# Patient Record
Sex: MALE | Race: WHITE | URBAN - METROPOLITAN AREA
[De-identification: names, ages, dates, MRNs, and addresses within clinical notes are randomized per-mention and may not be internally consistent; named-entity substitution may affect disease eponyms.]

---

## 2024-03-20 ENCOUNTER — LAB (OUTPATIENT)
Dept: URBAN - METROPOLITAN AREA CLINIC 113 | Facility: CLINIC | Age: 44
End: 2024-03-20

## 2024-03-20 ENCOUNTER — OV NP (OUTPATIENT)
Dept: URBAN - METROPOLITAN AREA CLINIC 113 | Facility: CLINIC | Age: 44
End: 2024-03-20
Payer: SELF-PAY

## 2024-03-20 VITALS
SYSTOLIC BLOOD PRESSURE: 125 MMHG | RESPIRATION RATE: 16 BRPM | TEMPERATURE: 99.5 F | HEIGHT: 74 IN | HEART RATE: 116 BPM | WEIGHT: 223 LBS | BODY MASS INDEX: 28.62 KG/M2 | DIASTOLIC BLOOD PRESSURE: 80 MMHG

## 2024-03-20 DIAGNOSIS — K70.31 ALCOHOLIC CIRRHOSIS OF LIVER WITH ASCITES: ICD-10-CM

## 2024-03-20 DIAGNOSIS — R60.0 LOWER EXTREMITY EDEMA: ICD-10-CM

## 2024-03-20 DIAGNOSIS — R14.0 BLOATING: ICD-10-CM

## 2024-03-20 PROBLEM — 420054005: Status: ACTIVE | Noted: 2024-03-20

## 2024-03-20 PROCEDURE — 99204 OFFICE O/P NEW MOD 45 MIN: CPT | Performed by: INTERNAL MEDICINE

## 2024-03-20 RX ORDER — AMLODIPINE BESYLATE 10 MG/1
1 TABLET TABLET ORAL ONCE A DAY
Status: ACTIVE | COMMUNITY

## 2024-03-20 RX ORDER — FUROSEMIDE 20 MG/1
1 TABLET TABLET ORAL ONCE A DAY
Qty: 90 TABLET | Refills: 3 | OUTPATIENT
Start: 2024-03-20

## 2024-03-20 RX ORDER — SPIRONOLACTONE 50 MG/1
1 TABLET TABLET, FILM COATED ORAL ONCE A DAY
Qty: 90 TABLET | Refills: 3 | OUTPATIENT
Start: 2024-03-20 | End: 2025-03-15

## 2024-03-20 NOTE — PHYSICAL EXAM GASTROINTESTINAL
Abdomen is soft, nontender, moderately distended and tympanic , no rebound or guarding. No organomegaly appreciated

## 2024-03-20 NOTE — HPI-TODAY'S VISIT:
Patient presents today for initial evaluation.  Recently seen in the emergency department with few months of abdominal bloating.  Had imaging and labs consistent with cirrhosis of the liver.  He does report drinking on a daily basis.  Tells me he was not drinking to get drunk but would drink multiple beers and sometimes a few shots a day.  This has been longstanding.  Denies any obvious jaundice or dark urine.  No recent blood in the stool.  He does report lower extremity swelling for some time.  He denies any a lot of salt in his diet.  He tells me he stopped drinking now completely for the last 2 weeks.  He is accompanied by his parents today who confirmed.  No history of IV drug use or intranasal cocaine use.  No family history of liver disease. I reviewed labs and imaging.  He had normal CBC.  Bilirubin 3.9, AST 65, ALT 23, alk phos 230.  Normal amylase and lipase.  His ultrasound did show cirrhosis with some mild ascites.  no confusion.

## 2024-03-21 LAB
A/G RATIO: 0.7
ABSOLUTE BASOPHILS: 61
ABSOLUTE EOSINOPHILS: 112
ABSOLUTE LYMPHOCYTES: 1571
ABSOLUTE MONOCYTES: 602
ABSOLUTE NEUTROPHILS: 7854
ALBUMIN: 3.5
ALKALINE PHOSPHATASE: 195
ALT (SGPT): 15
AST (SGOT): 42
BASOPHILS: 0.6
BILIRUBIN, TOTAL: 3.2
BUN/CREATININE RATIO: (no result)
BUN: 9
CALCIUM: 8.9
CARBON DIOXIDE, TOTAL: 21
CHLORIDE: 96
CREATININE: 0.67
EGFR: 117
EOSINOPHILS: 1.1
GLOBULIN, TOTAL: 5.3
GLUCOSE: 78
HEMATOCRIT: 43.5
HEMOGLOBIN: 15.1
INR: 1.2
LYMPHOCYTES: 15.4
MCH: 36
MCHC: 34.7
MCV: 103.8
MONOCYTES: 5.9
MPV: 9.8
NEUTROPHILS: 77
PLATELET COUNT: 215
POTASSIUM: 3.7
PROTEIN, TOTAL: 8.8
PT: 13
RDW: 11.9
RED BLOOD CELL COUNT: 4.19
SODIUM: 133
WHITE BLOOD CELL COUNT: 10.2

## 2024-04-01 ENCOUNTER — EGD (OUTPATIENT)
Dept: URBAN - METROPOLITAN AREA MEDICAL CENTER 2 | Facility: MEDICAL CENTER | Age: 44
End: 2024-04-01
Payer: SELF-PAY

## 2024-04-01 DIAGNOSIS — K76.6 CLINICALLY SIGNIFICANT PORTAL HYPERTENSION: ICD-10-CM

## 2024-04-01 DIAGNOSIS — I85.10 ESOPH VARICE OTHER DIS: ICD-10-CM

## 2024-04-01 DIAGNOSIS — K31.89 ACHYLIA: ICD-10-CM

## 2024-04-01 DIAGNOSIS — K74.69 CIRRHOSIS, CRYPTOGENIC: ICD-10-CM

## 2024-04-01 PROCEDURE — 43244 EGD VARICES LIGATION: CPT | Performed by: INTERNAL MEDICINE

## 2024-04-01 RX ORDER — AMLODIPINE BESYLATE 10 MG/1
1 TABLET TABLET ORAL ONCE A DAY
Status: ACTIVE | COMMUNITY

## 2024-04-01 RX ORDER — SPIRONOLACTONE 50 MG/1
1 TABLET TABLET, FILM COATED ORAL ONCE A DAY
Qty: 90 TABLET | Refills: 3 | Status: ACTIVE | COMMUNITY
Start: 2024-03-20 | End: 2025-03-15

## 2024-04-01 RX ORDER — FUROSEMIDE 20 MG/1
1 TABLET TABLET ORAL ONCE A DAY
Qty: 90 TABLET | Refills: 3 | Status: ACTIVE | COMMUNITY
Start: 2024-03-20

## 2024-05-16 ENCOUNTER — OFFICE VISIT (OUTPATIENT)
Dept: URBAN - METROPOLITAN AREA CLINIC 113 | Facility: CLINIC | Age: 44
End: 2024-05-16
Payer: SELF-PAY

## 2024-05-16 ENCOUNTER — DASHBOARD ENCOUNTERS (OUTPATIENT)
Age: 44
End: 2024-05-16

## 2024-05-16 VITALS
TEMPERATURE: 98.6 F | WEIGHT: 186 LBS | DIASTOLIC BLOOD PRESSURE: 70 MMHG | RESPIRATION RATE: 16 BRPM | HEART RATE: 105 BPM | SYSTOLIC BLOOD PRESSURE: 101 MMHG | BODY MASS INDEX: 23.87 KG/M2 | HEIGHT: 74 IN

## 2024-05-16 DIAGNOSIS — K70.31 ALCOHOLIC CIRRHOSIS OF LIVER WITH ASCITES: ICD-10-CM

## 2024-05-16 DIAGNOSIS — R60.0 LOWER EXTREMITY EDEMA: ICD-10-CM

## 2024-05-16 PROCEDURE — 99214 OFFICE O/P EST MOD 30 MIN: CPT | Performed by: INTERNAL MEDICINE

## 2024-05-16 RX ORDER — AMLODIPINE BESYLATE 10 MG/1
1 TABLET TABLET ORAL ONCE A DAY
Status: ACTIVE | COMMUNITY

## 2024-05-16 RX ORDER — SPIRONOLACTONE 50 MG/1
1 TABLET TABLET, FILM COATED ORAL ONCE A DAY
Qty: 90 TABLET | Refills: 3 | Status: ACTIVE | COMMUNITY
Start: 2024-03-20 | End: 2025-03-15

## 2024-05-16 RX ORDER — FUROSEMIDE 20 MG/1
1 TABLET TABLET ORAL ONCE A DAY
Qty: 90 TABLET | Refills: 3 | Status: ACTIVE | COMMUNITY
Start: 2024-03-20

## 2024-05-17 LAB
A/G RATIO: 0.8
ABSOLUTE BASOPHILS: 60
ABSOLUTE EOSINOPHILS: 120
ABSOLUTE LYMPHOCYTES: 1410
ABSOLUTE MONOCYTES: 426
ABSOLUTE NEUTROPHILS: 3984
ALBUMIN: 3.9
ALKALINE PHOSPHATASE: 169
ALT (SGPT): 12
AST (SGOT): 24
BASOPHILS: 1
BILIRUBIN, TOTAL: 2.1
BUN/CREATININE RATIO: (no result)
BUN: 10
CALCIUM: 9.4
CARBON DIOXIDE, TOTAL: 24
CHLORIDE: 98
CREATININE: 0.79
EGFR: 112
EOSINOPHILS: 2
GLOBULIN, TOTAL: 4.8
GLUCOSE: 88
HEMATOCRIT: 39.9
HEMOGLOBIN: 14
INR: 1.2
LYMPHOCYTES: 23.5
MCH: 34.1
MCHC: 35.1
MCV: 97.1
MONOCYTES: 7.1
MPV: 9.2
NEUTROPHILS: 66.4
PLATELET COUNT: 168
POTASSIUM: 4.2
PROTEIN, TOTAL: 8.7
PT: 12.5
RDW: 12.4
RED BLOOD CELL COUNT: 4.11
SODIUM: 133
WHITE BLOOD CELL COUNT: 6

## 2024-07-26 ENCOUNTER — TELEPHONE ENCOUNTER (OUTPATIENT)
Dept: URBAN - METROPOLITAN AREA CLINIC 113 | Facility: CLINIC | Age: 44
End: 2024-07-26

## 2024-08-15 ENCOUNTER — OFFICE VISIT (OUTPATIENT)
Dept: URBAN - METROPOLITAN AREA CLINIC 107 | Facility: CLINIC | Age: 44
End: 2024-08-15

## 2024-08-16 ENCOUNTER — OFFICE VISIT (OUTPATIENT)
Dept: URBAN - METROPOLITAN AREA CLINIC 107 | Facility: CLINIC | Age: 44
End: 2024-08-16
Payer: SELF-PAY

## 2024-08-16 VITALS
OXYGEN SATURATION: 98 % | BODY MASS INDEX: 23 KG/M2 | TEMPERATURE: 97.9 F | SYSTOLIC BLOOD PRESSURE: 107 MMHG | HEIGHT: 74 IN | HEART RATE: 109 BPM | WEIGHT: 179.2 LBS | DIASTOLIC BLOOD PRESSURE: 75 MMHG

## 2024-08-16 DIAGNOSIS — K70.31 ALCOHOLIC CIRRHOSIS OF LIVER WITH ASCITES: ICD-10-CM

## 2024-08-16 DIAGNOSIS — I85.10 SECONDARY ESOPHAGEAL VARICES WITHOUT BLEEDING: ICD-10-CM

## 2024-08-16 PROBLEM — 14223005: Status: ACTIVE | Noted: 2024-08-16

## 2024-08-16 PROCEDURE — 99215 OFFICE O/P EST HI 40 MIN: CPT | Performed by: NURSE PRACTITIONER

## 2024-08-16 RX ORDER — SPIRONOLACTONE 50 MG/1
1 TABLET TABLET, FILM COATED ORAL ONCE A DAY
Qty: 90 TABLET | Refills: 3 | Status: ACTIVE | COMMUNITY
Start: 2024-03-20 | End: 2025-03-15

## 2024-08-16 RX ORDER — SPIRONOLACTONE 100 MG/1
1 TABLET TABLET, FILM COATED ORAL ONCE A DAY
Qty: 30 | Refills: 1
Start: 2024-03-20 | End: 2024-10-15

## 2024-08-16 RX ORDER — FUROSEMIDE 40 MG/1
1 TABLET TABLET ORAL ONCE A DAY
Qty: 30 | Refills: 1
Start: 2024-03-20 | End: 2024-10-15

## 2024-08-16 RX ORDER — FUROSEMIDE 20 MG/1
1 TABLET TABLET ORAL ONCE A DAY
Qty: 90 TABLET | Refills: 3 | Status: ACTIVE | COMMUNITY
Start: 2024-03-20

## 2024-08-16 RX ORDER — AMLODIPINE BESYLATE 10 MG/1
1 TABLET TABLET ORAL ONCE A DAY
Status: ACTIVE | COMMUNITY

## 2024-08-16 NOTE — HPI-TODAY'S VISIT:
43-year old male her for hospital follow-up.  He was seen with new diagnosis of alcohol cirrhosis with ascites as well as some lower extremity edema.  He has been placed on diuretics.  His workup thus far is included an ultrasound showed no evidence of mass.  Labs reviewed with him as below.  He underwent an EGD 4/1/2024 which demonstrated moderate portal hypertensive gastropathy as well as large varices status post banding x 2.  He denies any new complaints.  He is not having any bleeding.  No jaundice or dark urine.  No confusion.  This is confirmed by family.  His lower extremity edema is significantly improved on Lasix 20 mg and Aldactone 50 mg daily.  He is avoiding salt.  He has not had any alcohol. He was congratulated on alcohol abstinence.  To be able to come off diuretics.  Follow up 8/16/2024 Labs 5/17/2024.  CMP revealed sodium 133, creatinine 0.79, total bilirubin 2.1, alk phos 169.  CBC revealed RBC 4.11 with normal hemoglobin 14, platelet 168.  INR 1.2. MELD-Na score of 17.    Was at [facility] 7/31/2024 and 8/1/2024 with ascites.  There was no availability for ultrasound-guided paracentesis on 7/31/24.  He refused admission.  He returned to the ER on 8/1/2024 had 13.9 L of fluid removed.  Albumin replaced. CT abdomen and pelvis with contrast 7/31/2024 revealed cirrhotic morphology with large volume abdominal pelvic ascites.  AVN of bilateral femoral heads with suggestion of partial collapse on the left.  Moderate right trace left pleural effusions.  Splenomegaly measuring 14.5 cm. Labs 7/31/2024.  CBC revealed Hgb 14, RBC low at 4.07, MCV 99.5 with normal platelet of 206.  CMP revealed creatinine 0.78, total bilirubin 2.3, alk phos 182, sodium 132, INR 1.37.  Lipase normal.  MELD-Na score of 17 Today he is her with his mother.  He has remained abstinent from alcohol.  He feels his abdomen fluid has returned although not as bad as it was previously.  Weight is down 5 pounds since his last appointment.  He denies constipation, diarrhea. Has early satiety. Mother denies confusion. He is watching what he eats.  No melena or hematochezia.

## 2024-08-23 ENCOUNTER — LAB OUTSIDE AN ENCOUNTER (OUTPATIENT)
Dept: URBAN - METROPOLITAN AREA CLINIC 107 | Facility: CLINIC | Age: 44
End: 2024-08-23

## 2024-08-24 LAB
BUN/CREATININE RATIO: (no result)
CALCIUM: 8.9
CARBON DIOXIDE: 26
CHLORIDE: 96
CREATININE: 0.76
EGFR: 113
GLUCOSE: 78
POTASSIUM: 4.2
SODIUM: 130
UREA NITROGEN (BUN): 13

## 2024-09-11 ENCOUNTER — OFFICE VISIT (OUTPATIENT)
Dept: URBAN - METROPOLITAN AREA CLINIC 113 | Facility: CLINIC | Age: 44
End: 2024-09-11
Payer: SELF-PAY

## 2024-09-11 ENCOUNTER — LAB OUTSIDE AN ENCOUNTER (OUTPATIENT)
Dept: URBAN - METROPOLITAN AREA CLINIC 113 | Facility: CLINIC | Age: 44
End: 2024-09-11

## 2024-09-11 VITALS
HEIGHT: 74 IN | WEIGHT: 175 LBS | DIASTOLIC BLOOD PRESSURE: 60 MMHG | BODY MASS INDEX: 22.46 KG/M2 | HEART RATE: 101 BPM | SYSTOLIC BLOOD PRESSURE: 100 MMHG | TEMPERATURE: 98.1 F

## 2024-09-11 DIAGNOSIS — K70.31 ALCOHOLIC CIRRHOSIS OF LIVER WITH ASCITES: ICD-10-CM

## 2024-09-11 DIAGNOSIS — I85.10 SECONDARY ESOPHAGEAL VARICES WITHOUT BLEEDING: ICD-10-CM

## 2024-09-11 PROCEDURE — 99214 OFFICE O/P EST MOD 30 MIN: CPT | Performed by: INTERNAL MEDICINE

## 2024-09-11 RX ORDER — FUROSEMIDE 20 MG/1
1 TABLET TABLET ORAL ONCE A DAY
Qty: 30 | Refills: 1 | Status: ACTIVE | COMMUNITY
Start: 2024-03-20 | End: 2024-10-15

## 2024-09-11 RX ORDER — SPIRONOLACTONE 50 MG/1
1 TABLET TABLET, FILM COATED ORAL ONCE A DAY
Qty: 30 | Refills: 1 | Status: ACTIVE | COMMUNITY
Start: 2024-03-20 | End: 2024-10-15

## 2024-09-11 RX ORDER — AMLODIPINE BESYLATE 10 MG/1
1 TABLET TABLET ORAL ONCE A DAY
Status: ON HOLD | COMMUNITY

## 2024-09-11 NOTE — PHYSICAL EXAM GASTROINTESTINAL
Abdomen is soft, nontender, moderatedly distended , no rebound or guarding. No organomegaly appreciated

## 2024-09-11 NOTE — HPI-TODAY'S VISIT:
Patient presents today in follow-up.  He has required paracentesis every 3 to 4 weeks.  He is following a low-sodium diet.  He has been sober from alcohol for 6 months.  No lower extremity edema.  He is taking Lasix 20 mg and Aldactone 50 mg daily.  Some nipple pain.  Denies any blood in his stool.  Urine is occasionally dark.  No confusion.  He has history of esophageal varices which were large and previously banded.

## 2024-10-01 ENCOUNTER — LAB OUTSIDE AN ENCOUNTER (OUTPATIENT)
Dept: URBAN - METROPOLITAN AREA CLINIC 113 | Facility: CLINIC | Age: 44
End: 2024-10-01

## 2024-10-01 ENCOUNTER — TELEPHONE ENCOUNTER (OUTPATIENT)
Dept: URBAN - METROPOLITAN AREA CLINIC 113 | Facility: CLINIC | Age: 44
End: 2024-10-01

## 2024-10-14 ENCOUNTER — TELEPHONE ENCOUNTER (OUTPATIENT)
Dept: URBAN - METROPOLITAN AREA CLINIC 113 | Facility: CLINIC | Age: 44
End: 2024-10-14

## 2024-10-14 ENCOUNTER — LAB OUTSIDE AN ENCOUNTER (OUTPATIENT)
Dept: URBAN - METROPOLITAN AREA CLINIC 113 | Facility: CLINIC | Age: 44
End: 2024-10-14

## 2024-10-23 ENCOUNTER — OFFICE VISIT (OUTPATIENT)
Dept: URBAN - METROPOLITAN AREA MEDICAL CENTER 19 | Facility: MEDICAL CENTER | Age: 44
End: 2024-10-23

## 2024-10-23 ENCOUNTER — TELEPHONE ENCOUNTER (OUTPATIENT)
Dept: URBAN - METROPOLITAN AREA CLINIC 113 | Facility: CLINIC | Age: 44
End: 2024-10-23

## 2024-10-23 RX ORDER — AMLODIPINE BESYLATE 10 MG/1
1 TABLET TABLET ORAL ONCE A DAY
Status: ON HOLD | COMMUNITY

## 2024-11-13 ENCOUNTER — OFFICE VISIT (OUTPATIENT)
Dept: URBAN - METROPOLITAN AREA CLINIC 113 | Facility: CLINIC | Age: 44
End: 2024-11-13
Payer: SELF-PAY

## 2024-11-13 VITALS
DIASTOLIC BLOOD PRESSURE: 73 MMHG | BODY MASS INDEX: 23.02 KG/M2 | RESPIRATION RATE: 16 BRPM | WEIGHT: 179.4 LBS | TEMPERATURE: 98.2 F | HEART RATE: 102 BPM | SYSTOLIC BLOOD PRESSURE: 97 MMHG | HEIGHT: 74 IN

## 2024-11-13 DIAGNOSIS — R60.0 LOWER EXTREMITY EDEMA: ICD-10-CM

## 2024-11-13 DIAGNOSIS — I85.10 SECONDARY ESOPHAGEAL VARICES WITHOUT BLEEDING: ICD-10-CM

## 2024-11-13 DIAGNOSIS — K70.31 ALCOHOLIC CIRRHOSIS OF LIVER WITH ASCITES: ICD-10-CM

## 2024-11-13 PROCEDURE — 99214 OFFICE O/P EST MOD 30 MIN: CPT | Performed by: NURSE PRACTITIONER

## 2024-11-13 RX ORDER — SPIRONOLACTONE 50 MG/1
1 TABLET TABLET, FILM COATED ORAL ONCE A DAY
OUTPATIENT

## 2024-11-13 RX ORDER — FUROSEMIDE 20 MG/1
1 TABLET TABLET ORAL ONCE A DAY
OUTPATIENT

## 2024-11-13 RX ORDER — AMLODIPINE BESYLATE 10 MG/1
1 TABLET TABLET ORAL ONCE A DAY
Status: ON HOLD | COMMUNITY

## 2024-11-13 RX ORDER — SPIRONOLACTONE 50 MG/1
1 TABLET TABLET, FILM COATED ORAL ONCE A DAY
Status: ACTIVE | COMMUNITY

## 2024-11-13 RX ORDER — FUROSEMIDE 20 MG/1
1 TABLET TABLET ORAL ONCE A DAY
Status: ACTIVE | COMMUNITY

## 2024-11-13 NOTE — HPI-TODAY'S VISIT:
44-year-old gentleman with alcohol cirrhosis with ascites requiring paracenteses every 3 to 4 weeks despite following a low-sodium diet.  He has been sober from alcohol for at least 6 months.  He was noted to have good volume status regarding lower extremities using Lasix and Aldactone.  He was planned for repeat paracentesis.  A TIPS procedure was a consideration if he persistently requires large-volume paracentesis.  He was planned for an EGD for variceal screening.  EGD 10/23/2024 was notable for normal examined duodenum, portal hypertensive gastropathy, and large esophageal varices status post complete eradication with banding.  He was referred to vascular surgery for consideration of TIPS considering protein calorie malnutrition and refractory ascites.  His last paracentesis was performed 10/17/2024, notable for the removal of 12.5 L of ascitic fluid.  His last paracentesis was 2weeks ago tomorrow. His abdomen is distended currently. He has orders to repeat paracentesis and plans to repeat this tomorrow. There is no lower extremity edema. He is taking furosemide and spironolactone daily. There deneis any confusion. He sleeps poorly at nighttime. He has one maybe two bowel movements daily. Stools are solid. There is no nausea, vomiting, melena or red blood per rectum. He is following a low salt diet. There is no heartburn or dysphagia. He is sober from ETOH for the last 9 months. Alert-The patient is alert, awake and responds to voice. The patient is oriented to time, place, and person. The triage nurse is able to obtain subjective information.

## 2024-11-25 ENCOUNTER — TELEPHONE ENCOUNTER (OUTPATIENT)
Dept: URBAN - METROPOLITAN AREA CLINIC 113 | Facility: CLINIC | Age: 44
End: 2024-11-25

## 2025-08-21 ENCOUNTER — LAB OUTSIDE AN ENCOUNTER (OUTPATIENT)
Dept: URBAN - METROPOLITAN AREA CLINIC 113 | Facility: CLINIC | Age: 45
End: 2025-08-21

## 2025-08-21 ENCOUNTER — OFFICE VISIT (OUTPATIENT)
Dept: URBAN - METROPOLITAN AREA CLINIC 113 | Facility: CLINIC | Age: 45
End: 2025-08-21

## 2025-08-21 DIAGNOSIS — Z95.828 S/P TIPS (TRANSJUGULAR INTRAHEPATIC PORTOSYSTEMIC SHUNT): ICD-10-CM

## 2025-08-21 DIAGNOSIS — K70.31 ALCOHOLIC CIRRHOSIS OF LIVER WITH ASCITES: ICD-10-CM

## 2025-08-21 DIAGNOSIS — R60.0 LOWER EXTREMITY EDEMA: ICD-10-CM

## 2025-08-21 DIAGNOSIS — I85.10 SECONDARY ESOPHAGEAL VARICES WITHOUT BLEEDING: ICD-10-CM

## 2025-08-21 PROCEDURE — 99214 OFFICE O/P EST MOD 30 MIN: CPT | Performed by: INTERNAL MEDICINE

## 2025-08-21 RX ORDER — FUROSEMIDE 20 MG/1
1 TABLET TABLET ORAL ONCE A DAY
Status: ACTIVE | COMMUNITY

## 2025-08-21 RX ORDER — FUROSEMIDE 20 MG/1
1 TABLET TABLET ORAL ONCE A DAY
OUTPATIENT

## 2025-08-21 RX ORDER — AMLODIPINE BESYLATE 10 MG/1
1 TABLET TABLET ORAL ONCE A DAY
Status: ON HOLD | COMMUNITY

## 2025-08-21 RX ORDER — SPIRONOLACTONE 50 MG/1
1 TABLET TABLET, FILM COATED ORAL ONCE A DAY
OUTPATIENT

## 2025-08-21 RX ORDER — SPIRONOLACTONE 50 MG/1
1 TABLET TABLET, FILM COATED ORAL ONCE A DAY
Status: ACTIVE | COMMUNITY